# Patient Record
Sex: MALE | URBAN - METROPOLITAN AREA
[De-identification: names, ages, dates, MRNs, and addresses within clinical notes are randomized per-mention and may not be internally consistent; named-entity substitution may affect disease eponyms.]

---

## 2023-08-03 ENCOUNTER — EMERGENCY (EMERGENCY)
Facility: HOSPITAL | Age: 58
LOS: 1 days | Discharge: HOME | End: 2023-08-03
Attending: STUDENT IN AN ORGANIZED HEALTH CARE EDUCATION/TRAINING PROGRAM
Payer: COMMERCIAL

## 2023-08-03 VITALS
WEIGHT: 188.5 LBS | DIASTOLIC BLOOD PRESSURE: 80 MMHG | OXYGEN SATURATION: 98 % | RESPIRATION RATE: 18 BRPM | TEMPERATURE: 98 F | HEART RATE: 73 BPM | SYSTOLIC BLOOD PRESSURE: 150 MMHG

## 2023-08-03 VITALS
RESPIRATION RATE: 18 BRPM | HEART RATE: 68 BPM | OXYGEN SATURATION: 98 % | DIASTOLIC BLOOD PRESSURE: 68 MMHG | SYSTOLIC BLOOD PRESSURE: 142 MMHG | TEMPERATURE: 99 F

## 2023-08-03 LAB
ALBUMIN SERPL ELPH-MCNC: 3.7 G/DL — SIGNIFICANT CHANGE UP (ref 3.5–5)
ALP SERPL-CCNC: 81 U/L — SIGNIFICANT CHANGE UP (ref 40–120)
ALT FLD-CCNC: 34 U/L DA — SIGNIFICANT CHANGE UP (ref 10–60)
ANION GAP SERPL CALC-SCNC: 7 MMOL/L — SIGNIFICANT CHANGE UP (ref 5–17)
APPEARANCE UR: CLEAR — SIGNIFICANT CHANGE UP
AST SERPL-CCNC: 9 U/L — LOW (ref 10–40)
BACTERIA # UR AUTO: ABNORMAL /HPF
BASOPHILS # BLD AUTO: 0.04 K/UL — SIGNIFICANT CHANGE UP (ref 0–0.2)
BASOPHILS NFR BLD AUTO: 0.4 % — SIGNIFICANT CHANGE UP (ref 0–2)
BILIRUB SERPL-MCNC: 0.2 MG/DL — SIGNIFICANT CHANGE UP (ref 0.2–1.2)
BILIRUB UR-MCNC: NEGATIVE — SIGNIFICANT CHANGE UP
BUN SERPL-MCNC: 15 MG/DL — SIGNIFICANT CHANGE UP (ref 7–18)
CALCIUM SERPL-MCNC: 8.7 MG/DL — SIGNIFICANT CHANGE UP (ref 8.4–10.5)
CHLORIDE SERPL-SCNC: 105 MMOL/L — SIGNIFICANT CHANGE UP (ref 96–108)
CO2 SERPL-SCNC: 25 MMOL/L — SIGNIFICANT CHANGE UP (ref 22–31)
COLOR SPEC: YELLOW — SIGNIFICANT CHANGE UP
CREAT SERPL-MCNC: 0.9 MG/DL — SIGNIFICANT CHANGE UP (ref 0.5–1.3)
DIFF PNL FLD: NEGATIVE — SIGNIFICANT CHANGE UP
EGFR: 100 ML/MIN/1.73M2 — SIGNIFICANT CHANGE UP
EOSINOPHIL # BLD AUTO: 0.27 K/UL — SIGNIFICANT CHANGE UP (ref 0–0.5)
EOSINOPHIL NFR BLD AUTO: 2.7 % — SIGNIFICANT CHANGE UP (ref 0–6)
EPI CELLS # UR: NEGATIVE /HPF — SIGNIFICANT CHANGE UP
GLUCOSE SERPL-MCNC: 280 MG/DL — HIGH (ref 70–99)
GLUCOSE UR QL: NEGATIVE — SIGNIFICANT CHANGE UP
HCT VFR BLD CALC: 41 % — SIGNIFICANT CHANGE UP (ref 39–50)
HGB BLD-MCNC: 13.5 G/DL — SIGNIFICANT CHANGE UP (ref 13–17)
IMM GRANULOCYTES NFR BLD AUTO: 0.3 % — SIGNIFICANT CHANGE UP (ref 0–0.9)
KETONES UR-MCNC: NEGATIVE — SIGNIFICANT CHANGE UP
LEUKOCYTE ESTERASE UR-ACNC: NEGATIVE — SIGNIFICANT CHANGE UP
LIDOCAIN IGE QN: 808 U/L — HIGH (ref 73–393)
LYMPHOCYTES # BLD AUTO: 3.5 K/UL — HIGH (ref 1–3.3)
LYMPHOCYTES # BLD AUTO: 35.2 % — SIGNIFICANT CHANGE UP (ref 13–44)
MCHC RBC-ENTMCNC: 28.9 PG — SIGNIFICANT CHANGE UP (ref 27–34)
MCHC RBC-ENTMCNC: 32.9 GM/DL — SIGNIFICANT CHANGE UP (ref 32–36)
MCV RBC AUTO: 87.8 FL — SIGNIFICANT CHANGE UP (ref 80–100)
MONOCYTES # BLD AUTO: 0.73 K/UL — SIGNIFICANT CHANGE UP (ref 0–0.9)
MONOCYTES NFR BLD AUTO: 7.3 % — SIGNIFICANT CHANGE UP (ref 2–14)
NEUTROPHILS # BLD AUTO: 5.37 K/UL — SIGNIFICANT CHANGE UP (ref 1.8–7.4)
NEUTROPHILS NFR BLD AUTO: 54.1 % — SIGNIFICANT CHANGE UP (ref 43–77)
NITRITE UR-MCNC: NEGATIVE — SIGNIFICANT CHANGE UP
NRBC # BLD: 0 /100 WBCS — SIGNIFICANT CHANGE UP (ref 0–0)
PH UR: 6.5 — SIGNIFICANT CHANGE UP (ref 5–8)
PLATELET # BLD AUTO: 176 K/UL — SIGNIFICANT CHANGE UP (ref 150–400)
POTASSIUM SERPL-MCNC: 4.3 MMOL/L — SIGNIFICANT CHANGE UP (ref 3.5–5.3)
POTASSIUM SERPL-SCNC: 4.3 MMOL/L — SIGNIFICANT CHANGE UP (ref 3.5–5.3)
PROT SERPL-MCNC: 7.7 G/DL — SIGNIFICANT CHANGE UP (ref 6–8.3)
PROT UR-MCNC: 15 MG/DL
RBC # BLD: 4.67 M/UL — SIGNIFICANT CHANGE UP (ref 4.2–5.8)
RBC # FLD: 13.2 % — SIGNIFICANT CHANGE UP (ref 10.3–14.5)
RBC CASTS # UR COMP ASSIST: NEGATIVE /HPF — SIGNIFICANT CHANGE UP (ref 0–2)
SODIUM SERPL-SCNC: 137 MMOL/L — SIGNIFICANT CHANGE UP (ref 135–145)
SP GR SPEC: 1 — LOW (ref 1.01–1.02)
TROPONIN I, HIGH SENSITIVITY RESULT: 5.6 NG/L — SIGNIFICANT CHANGE UP
UROBILINOGEN FLD QL: NEGATIVE — SIGNIFICANT CHANGE UP
WBC # BLD: 9.94 K/UL — SIGNIFICANT CHANGE UP (ref 3.8–10.5)
WBC # FLD AUTO: 9.94 K/UL — SIGNIFICANT CHANGE UP (ref 3.8–10.5)
WBC UR QL: SIGNIFICANT CHANGE UP /HPF (ref 0–5)

## 2023-08-03 PROCEDURE — 80053 COMPREHEN METABOLIC PANEL: CPT

## 2023-08-03 PROCEDURE — 99285 EMERGENCY DEPT VISIT HI MDM: CPT | Mod: 25

## 2023-08-03 PROCEDURE — 83690 ASSAY OF LIPASE: CPT

## 2023-08-03 PROCEDURE — 96366 THER/PROPH/DIAG IV INF ADDON: CPT

## 2023-08-03 PROCEDURE — 93005 ELECTROCARDIOGRAM TRACING: CPT

## 2023-08-03 PROCEDURE — 74177 CT ABD & PELVIS W/CONTRAST: CPT | Mod: MA

## 2023-08-03 PROCEDURE — 87086 URINE CULTURE/COLONY COUNT: CPT

## 2023-08-03 PROCEDURE — 85025 COMPLETE CBC W/AUTO DIFF WBC: CPT

## 2023-08-03 PROCEDURE — 74177 CT ABD & PELVIS W/CONTRAST: CPT | Mod: 26,MA

## 2023-08-03 PROCEDURE — 96375 TX/PRO/DX INJ NEW DRUG ADDON: CPT

## 2023-08-03 PROCEDURE — 93010 ELECTROCARDIOGRAM REPORT: CPT

## 2023-08-03 PROCEDURE — 36415 COLL VENOUS BLD VENIPUNCTURE: CPT

## 2023-08-03 PROCEDURE — 96365 THER/PROPH/DIAG IV INF INIT: CPT | Mod: XU

## 2023-08-03 PROCEDURE — 84484 ASSAY OF TROPONIN QUANT: CPT

## 2023-08-03 PROCEDURE — 81001 URINALYSIS AUTO W/SCOPE: CPT

## 2023-08-03 RX ORDER — SODIUM CHLORIDE 9 MG/ML
1000 INJECTION, SOLUTION INTRAVENOUS ONCE
Refills: 0 | Status: DISCONTINUED | OUTPATIENT
Start: 2023-08-03 | End: 2023-08-03

## 2023-08-03 RX ORDER — SODIUM CHLORIDE 9 MG/ML
1000 INJECTION INTRAMUSCULAR; INTRAVENOUS; SUBCUTANEOUS ONCE
Refills: 0 | Status: DISCONTINUED | OUTPATIENT
Start: 2023-08-03 | End: 2023-08-03

## 2023-08-03 RX ORDER — ACETAMINOPHEN 500 MG
1000 TABLET ORAL ONCE
Refills: 0 | Status: COMPLETED | OUTPATIENT
Start: 2023-08-03 | End: 2023-08-03

## 2023-08-03 RX ORDER — SODIUM CHLORIDE 9 MG/ML
1000 INJECTION, SOLUTION INTRAVENOUS ONCE
Refills: 0 | Status: COMPLETED | OUTPATIENT
Start: 2023-08-03 | End: 2023-08-03

## 2023-08-03 RX ORDER — FAMOTIDINE 10 MG/ML
20 INJECTION INTRAVENOUS ONCE
Refills: 0 | Status: COMPLETED | OUTPATIENT
Start: 2023-08-03 | End: 2023-08-03

## 2023-08-03 RX ADMIN — SODIUM CHLORIDE 1000 MILLILITER(S): 9 INJECTION, SOLUTION INTRAVENOUS at 01:51

## 2023-08-03 RX ADMIN — Medication 1000 MILLIGRAM(S): at 03:10

## 2023-08-03 RX ADMIN — FAMOTIDINE 20 MILLIGRAM(S): 10 INJECTION INTRAVENOUS at 01:51

## 2023-08-03 RX ADMIN — SODIUM CHLORIDE 1000 MILLILITER(S): 9 INJECTION, SOLUTION INTRAVENOUS at 03:10

## 2023-08-03 RX ADMIN — Medication 400 MILLIGRAM(S): at 01:51

## 2023-08-03 NOTE — ED PROVIDER NOTE - PHYSICAL EXAMINATION
CONSTITUTIONAL: non-toxic, well appearing  SKIN: no rash, no petechiae.  EYES: pink conjunctiva, anicteric  NECK: Supple; no meningismus, no JVD  CARD: RRR, no murmurs, equal radial pulses bilaterally 2+  RESP: CTAB, no respiratory distress  ABD: Soft, tender over epigastrium and RUQ, neg Weller's sign, non-distended, no peritoneal signs, no CVA tenderness  EXT: Normal ROM x4. No edema.   NEURO: Alert, oriented. Neuro exam nonfocal  PSYCH: Cooperative, appropriate.

## 2023-08-03 NOTE — ED PROVIDER NOTE - NSFOLLOWUPINSTRUCTIONS_ED_ALL_ED_FT
Rest and stay well hydrated.     Take over the counter acetaminophen (Tylenol) 650-1000 mg every 4-6 hours as needed for pain. Do not take more than 3000 mg in a 24 hour period. Be aware many over the counter and prescription medications also contain acetaminophen (Tylenol).     Take over the counter ibuprofen 400-600 mg every 6 hours with food as needed for pain.  Do not take these medications if you do not have pain or if you have any history of bleeding disorder or, kidney disease. Do not use ibuprofen if you are on blood thinners (anti-coagulation).    Follow up with gastroenterologist as discussed (information provided).    SEEK IMMEDIATE MEDICAL CARE IF YOU HAVE ANY OF THE FOLLOWING SYMPTOMS: worsening abdominal pain, uncontrollable vomiting, profuse diarrhea, inability to have bowel movements or pass gas, black or bloody stools, fever accompanying chest pain or back pain, or fainting.

## 2023-08-03 NOTE — ED PROVIDER NOTE - PATIENT PORTAL LINK FT
You can access the FollowMyHealth Patient Portal offered by [facility] by registering at the following website: [email or web address]. By joining [employer]’s FollowMyHealth portal, you will also be able to view your health information using other applications (apps) compatible with our system.

## 2023-08-03 NOTE — ED PROVIDER NOTE - PROGRESS NOTE DETAILS
Jorge-: pt seen and re-evaluated at bedside.  Pt states their symptoms have improved.  Pt comfortable in NAD. CT showing "Minimal haziness suspected around the pancreatic head/uncinate process.   Recommend correlation with lipase for acute pancreatitis." Lipase mildly elevated. Pain well controlled, pt tolerating PO intake, pt appropriate for outpatient GI follow up. Discussed strict return precautions, pt understood and agreeable with plan.

## 2023-08-03 NOTE — ED ADULT NURSE NOTE - NSFALLUNIVINTERV_ED_ALL_ED
Bed/Stretcher in lowest position, wheels locked, appropriate side rails in place/Call bell, personal items and telephone in reach/Instruct patient to call for assistance before getting out of bed/chair/stretcher/Non-slip footwear applied when patient is off stretcher/[place] to call system/Physically safe environment - no spills, clutter or unnecessary equipment/Purposeful proactive rounding/Room/bathroom lighting operational, light cord in reach

## 2023-08-03 NOTE — ED PROVIDER NOTE - CLINICAL SUMMARY MEDICAL DECISION MAKING FREE TEXT BOX
Roel: 58-year-old male with past medical history hypertension, diabetes presents with abdominal pain x3 to 4 days.  Patient reports intermittent bilateral upper abdominal pain radiating to the back over the past 3 to 4 days.  Denies any fevers, chest pain, shortness of breath, vomiting, diarrhea, dysuria, numbness, weakness, or rash.  Denies any worsening with exertion, movement, position change, or oral intake.  Denies any history of abdominal surgeries in the past.  Patient reports occasional alcohol use, denies any recent alcohol use over the past week.  Patient tolerating oral intake.  Physical exam per above. Unclear etiology, ddx includes but not limited to gastritis, PUD, pancreatitis, cholelithiasis, cholecystitis, choledocholithiasis. Pain reproducible with palpation of abdomen, not consistent with ACS/anginal equivalent. Will obtain labs, imaging, provide supportive treatment with dispo pending workup.

## 2023-08-03 NOTE — ED PROVIDER NOTE - OBJECTIVE STATEMENT
58-year-old male with past medical history hypertension, diabetes presents with abdominal pain x3 to 4 days.  Patient reports intermittent bilateral upper abdominal pain radiating to the back over the past 3 to 4 days.  Denies any fevers, chest pain, shortness of breath, vomiting, diarrhea, dysuria, numbness, weakness, or rash.  Denies any worsening with exertion, movement, position change, or oral intake.  Denies any history of abdominal surgeries in the past.  Patient reports occasional alcohol use, denies any recent alcohol use over the past week.  Patient tolerating oral intake.  Denies any additional complaints.

## 2023-08-03 NOTE — ED ADULT NURSE NOTE - OBJECTIVE STATEMENT
generalized abdominal pain x couple of days with epigastric pain. denies nausea, vomiting, diarrhea and constipation. admits to ETOH use a few days ago.

## 2023-08-04 LAB
CULTURE RESULTS: NO GROWTH — SIGNIFICANT CHANGE UP
SPECIMEN SOURCE: SIGNIFICANT CHANGE UP

## 2024-01-12 VITALS
HEIGHT: 74 IN | TEMPERATURE: 98 F | HEART RATE: 86 BPM | SYSTOLIC BLOOD PRESSURE: 113 MMHG | OXYGEN SATURATION: 97 % | WEIGHT: 186.95 LBS | DIASTOLIC BLOOD PRESSURE: 76 MMHG | RESPIRATION RATE: 16 BRPM

## 2024-01-12 RX ORDER — CHLORHEXIDINE GLUCONATE 213 G/1000ML
1 SOLUTION TOPICAL ONCE
Refills: 0 | Status: DISCONTINUED | OUTPATIENT
Start: 2024-01-16 | End: 2024-01-17

## 2024-01-12 RX ORDER — SODIUM CHLORIDE 9 MG/ML
500 INJECTION INTRAMUSCULAR; INTRAVENOUS; SUBCUTANEOUS
Refills: 0 | Status: DISCONTINUED | OUTPATIENT
Start: 2024-01-16 | End: 2024-01-17

## 2024-01-12 NOTE — H&P ADULT - RADIAL PULSE
2+ b/l unable to palpate R radial pulse (+dopplerable, good waveform, collateral flow to hand confirmed; capillary refill <2 seconds; well-healing incision to right radial artery area); left 2+

## 2024-01-12 NOTE — H&P ADULT - ASSESSMENT
58-year-old male, current smoker (15 pack years), PMHx HTN, HLD, T2DM who in light of patient's risk factors and known CAD on recent cath, pt now presents to [facility] for staged cardiac catheterization of LCx.     ASA IV				Mallampati class: II	                - VSS  - H/H stable, patient denies BRBPR, melena, hematuria, or further bleeding.   -  Sedation Plan: Moderate  - Patient Is Suitable Candidate For Sedation?  Yes  - Cath Order Entered: Yes  - DAPT LOAD Ordered: Patient has currently taken ASA 81 mg QD and Plavix 75 mg QD since 1/10/24 and reports good adherence, denies missed doses; last dose of both 1/15/24 AM. Ordered ASA 81 mg POx1 and Plavix 300 mg POx 1 (confirmed with interventional fellow Dr. Chino).   - PRE-CATH FLUIDS: Yes; patient is euvolemic on exam; Cr 0.85; EF 62% (per TTE 1/9/24). Ordered IV fluid hydration per protocol: IV  cc bolusx 1 over 30 mins followed by IV NS 75 cc/hr x 2 hours.   - ALLERGY: No indication for pre-cath steroid prophylaxis   - Informed consent is in the patient's chart.    Risks Benefits Annotation:     CARDIAC CATH: Risks & benefits of procedure and sedation and risks and benefits for the alternative therapy have been explained to the patient and/or HCP in layman’s terms including but not limited to: allergic reaction, bleeding, infection, arrhythmia, respiratory compromise, renal and vascular compromise, limb damage, MI, CVA, emergent CABG/Vascular Surgery and death. Informed consent obtained and in chart.   58-year-old male, current smoker (15 pack years), PMHx HTN, HLD, T2DM who in light of patient's risk factors and known CAD on recent cath, pt now presents to [facility] for staged cardiac catheterization of LCx.     ASA IV				Mallampati class: II	                - VSS  - H/H stable, patient denies BRBPR, melena, hematuria, or further bleeding.   -  Sedation Plan: Moderate  - Patient Is Suitable Candidate For Sedation?  Yes  - Cath Order Entered: Yes  - DAPT LOAD Ordered: Patient has currently taken ASA 81 mg QD and Plavix 75 mg QD since 1/10/24 and reports good adherence, denies missed doses; last dose of both 1/15/24 AM. Ordered ASA 81 mg POx1 and Plavix 300 mg POx 1 (confirmed with interventional fellow Dr. Chino).   - PRE-CATH FLUIDS: Yes; patient is euvolemic on exam; Cr 0.85; EF 62% (per TTE 1/9/24). Ordered IV fluid hydration per protocol: IV  cc bolusx 1 over 30 mins followed by IV NS 75 cc/hr x 2 hours.   - ALLERGY: No indication for pre-cath steroid prophylaxis   - Informed consent is in the patient's chart.    Risks Benefits Annotation:     CARDIAC CATH: Risks & benefits of procedure and sedation and risks and benefits for the alternative therapy have been explained to the patient and/or HCP in layman’s terms including but not limited to: allergic reaction, bleeding, infection, arrhythmia, respiratory compromise, renal and vascular compromise, limb damage, MI, CVA, emergent CABG/Vascular Surgery and death. Informed consent obtained and in chart.   58-year-old male, current smoker (15 pack years), PMHx HTN, HLD, T2DM who in light of patient's risk factors and known CAD on recent cath, pt now presents to [facility] for staged cardiac catheterization of LCx.     ASA IV				Mallampati class: II	                - VSS  - H/H stable, patient denies BRBPR, melena, hematuria, or further bleeding.   -  Sedation Plan: Moderate  - Patient Is Suitable Candidate For Sedation?  Yes  - Cath Order Entered: Yes  - DAPT LOAD Ordered: Patient has currently taken ASA 81 mg QD and Plavix 75 mg QD since 1/10/24 and reports good adherence, denies missed doses; last dose of both 1/15/24 AM. Ordered ASA 81 mg POx1 and Plavix 300 mg POx 1 (confirmed with interventional fellow Dr. Chino).   - PRE-CATH FLUIDS: Yes; patient is euvolemic on exam; Cr 0.85; EF 62% (per TTE 1/9/24). Ordered IV fluid hydration per protocol: IV  cc bolusx 1 over 30 mins followed by IV NS 75 cc/hr x 2 hours.   - ALLERGY: No indication for pre-cath steroid prophylaxis   - Informed consent is in the patient's chart.    Risks Benefits Annotation:     CARDIAC CATH: Risks & benefits of procedure and sedation and risks and benefits for the alternative therapy have been explained to the patient and/or HCP in layman’s terms including but not limited to: allergic reaction, bleeding, infection, arrhythmia, respiratory compromise, renal and vascular compromise, limb damage, MI, CVA, emergent CABG/Vascular Surgery and death. Informed consent obtained and in chart.   58-year-old male, current smoker (15 pack years), PMHx HTN, HLD, T2DM who in light of patient's risk factors and known CAD on recent cath, pt now presents to [facility] for staged cardiac catheterization of LCx.     ASA IV				Mallampati class: II	                - VSS  - H/H stable, patient denies BRBPR, melena, hematuria, or further bleeding.   -  Sedation Plan: Moderate  - Patient Is Suitable Candidate For Sedation?  Yes  - Cath Order Entered: Yes  - DAPT LOAD Ordered: Patient has currently taken ASA 81 mg QD and Plavix 75 mg QD since 1/10/24 and reports good adherence, denies missed doses; last dose of both 1/15/24 AM. Ordered ASA 81 mg POx1 and Plavix 300 mg POx 1 (confirmed with interventional fellow Dr. Chino).   - PRE-CATH FLUIDS: Yes; patient is euvolemic on exam; Cr 0.85; EF 62% (per TTE 1/9/24). Ordered IV fluid hydration per protocol: IV  cc bolusx 1 over 30 mins followed by IV NS 75 cc/hr x 2 hours.   - ALLERGY: No indication for pre-cath steroid prophylaxis   - Informed consent is in the patient's chart.    Risks Benefits Annotation:     CARDIAC CATH: Risks & benefits of procedure and sedation and risks and benefits for the alternative therapy have been explained to the patient and/or HCP in layman’s terms including but not limited to: allergic reaction, bleeding, infection, arrhythmia, respiratory compromise, renal and vascular compromise, limb damage, MI, CVA, emergent CABG/Vascular Surgery and death. Informed consent obtained and in chart.   58-year-old male, current smoker (15 pack years), PMHx HTN, HLD, T2DM who in light of patient's risk factors and known CAD on recent cath, pt now presents to [facility] for staged cardiac catheterization of LCx.     ASA IV				Mallampati class: II	                - VSS  - H/H stable, patient denies BRBPR, melena, hematuria, or further bleeding.   -  Sedation Plan: Moderate  - Patient Is Suitable Candidate For Sedation?  Yes  - Cath Order Entered: Yes  - DAPT LOAD Ordered: Patient has currently taken ASA 81 mg QD and Plavix 75 mg QD since 1/10/24 and reports good adherence, denies missed doses; last dose of both 1/15/24 AM. Ordered ASA 81 mg POx1 and Plavix 300 mg POx 1 (confirmed with interventional (IC) fellow Dr. Chino).   - PRE-CATH FLUIDS: Yes; patient is euvolemic on exam; Cr 0.85; EF 62% (per TTE 1/9/24). Ordered IV fluid hydration per protocol: IV  cc bolusx 1 over 30 mins followed by IV NS 75 cc/hr x 2 hours.   - ALLERGY: No indication for pre-cath steroid prophylaxis   - Discussed access with IC fellow; will access left radial artery.  - Informed consent is in the patient's chart.    Risks Benefits Annotation:     CARDIAC CATH: Risks & benefits of procedure and sedation and risks and benefits for the alternative therapy have been explained to the patient and/or HCP in layman’s terms including but not limited to: allergic reaction, bleeding, infection, arrhythmia, respiratory compromise, renal and vascular compromise, limb damage, MI, CVA, emergent CABG/Vascular Surgery and death. Informed consent obtained and in chart.   58-year-old male, current smoker (15 pack years), PMHx HTN, HLD, T2DM who in light of patient's risk factors and known CAD on recent cath, pt now presents to [facility] for staged cardiac catheterization of LCx.     ASA IV				Mallampati class: II	                - VSS  - H/H stable, patient denies BRBPR, melena, hematuria, or further bleeding.   -  Sedation Plan: Moderate  - Patient Is Suitable Candidate For Sedation?  Yes  - Cath Order Entered: Yes  - DAPT LOAD Ordered: Patient has currently taken ASA 81 mg QD and Plavix 75 mg QD since 1/10/24 and reports good adherence, denies missed doses; last dose of both 1/15/24 AM. Ordered ASA 81 mg POx1 and Plavix 300 mg POx 1 (confirmed with interventional (IC) fellow Dr. Chino).   - PRE-CATH FLUIDS: Yes; patient is euvolemic on exam; Cr 0.85; EF 62% (per TTE 1/9/24). Ordered IV fluid hydration per protocol: IV  cc bolusx 1 over 30 mins followed by IV NS 75 cc/hr x 2 hours.   - ALLERGY: No indication for pre-cath steroid prophylaxis   - Discussed access with IC fellow; will access left radial artery.  - Informed consent is in the patient's chart.    Risks Benefits Annotation:     CARDIAC CATH: Risks & benefits of procedure and sedation and risks and benefits for the alternative therapy have been explained to the patient and/or HCP in layman’s terms including but not limited to: allergic reaction, bleeding, infection, arrhythmia, respiratory compromise, renal and vascular compromise, limb damage, MI, CVA, emergent CABG/Vascular Surgery and death. Informed consent obtained and in chart.   58-year-old male, current smoker (15 pack years), PMHx HTN, HLD, T2DM who in light of patient's risk factors and known CAD on recent cath, pt now presents to [facility] for staged cardiac catheterization of LCx.     ASA IV				Mallampati class: II	                - VSS  - H/H stable, patient denies BRBPR, melena, hematuria, or further bleeding.   -  Sedation Plan: Moderate  - Patient Is Suitable Candidate For Sedation?  Yes  - Cath Order Entered: Yes  - DAPT LOAD Ordered: Patient has currently taken ASA 81 mg QD and Plavix 75 mg QD since 1/10/24 and reports good adherence, denies missed doses; last dose of both 1/15/24 AM. Ordered ASA 81 mg POx1 and Plavix 300 mg POx 1 (confirmed with interventional (IC) fellow Dr. Chino).   - PRE-CATH FLUIDS: Yes; patient is euvolemic on exam; Cr 0.85; EF 62% (per TTE 1/9/24). Ordered IV fluid hydration per protocol: IV  cc bolusx 1 over 30 mins followed by IV NS 75 cc/hr x 2 hours.   - ALLERGY: No indication for pre-cath steroid prophylaxis   - Discussed access with IC fellow; will access left radial artery.  - Informed consent is in the patient's chart.    Risks Benefits Annotation:     CARDIAC CATH: Risks & benefits of procedure and sedation and risks and benefits for the alternative therapy have been explained to the patient and/or HCP in layman’s terms including but not limited to: allergic reaction, bleeding, infection, arrhythmia, respiratory compromise, renal and vascular compromise, limb damage, MI, CVA, emergent CABG/Vascular Surgery and death. Informed consent obtained and in chart.   58-year-old male, current smoker (15 pack years), PMHx HTN, HLD, T2DM who in light of patient's risk factors and known CAD on recent cath, pt now presents to [facility] for staged cardiac catheterization of LCx.     ASA IV				Mallampati class: II	                - VSS  - H/H stable, patient denies BRBPR, melena, hematuria, or further bleeding.   - WBC 12.54; pt afebrile and without infectious symptoms.   -  Sedation Plan: Moderate  - Patient Is Suitable Candidate For Sedation?  Yes  - Cath Order Entered: Yes  - DAPT LOAD Ordered: Patient has currently taken ASA 81 mg QD and Plavix 75 mg QD since 1/10/24 and reports good adherence, denies missed doses; last dose of both 1/15/24 AM. Ordered ASA 81 mg POx1 and Plavix 300 mg POx 1 (confirmed with interventional (IC) fellow Dr. Chino).   - PRE-CATH FLUIDS: Yes; patient is euvolemic on exam; Cr 0.85; EF 62% (per TTE 1/9/24). Ordered IV fluid hydration per protocol: IV  cc bolusx 1 over 30 mins followed by IV NS 75 cc/hr x 2 hours.   - ALLERGY: No indication for pre-cath steroid prophylaxis   - Discussed access with IC fellow; will access left radial artery.  - Informed consent is in the patient's chart.    Risks Benefits Annotation:     CARDIAC CATH: Risks & benefits of procedure and sedation and risks and benefits for the alternative therapy have been explained to the patient and/or HCP in layman’s terms including but not limited to: allergic reaction, bleeding, infection, arrhythmia, respiratory compromise, renal and vascular compromise, limb damage, MI, CVA, emergent CABG/Vascular Surgery and death. Informed consent obtained and in chart.   58-year-old male, current smoker (15 pack years), PMHx HTN, HLD, T2DM who in light of patient's risk factors and known CAD on recent cath, pt now presents to [facility] for staged cardiac catheterization of LCx.     ASA IV				Mallampati class: II	                - VSS  - H/H stable, patient denies BRBPR, melena, hematuria, or further bleeding.   - WBC 12.54; pt afebrile and without infectious symptoms.   -  Sedation Plan: Moderate  - Patient Is Suitable Candidate For Sedation?  Yes  - Cath Order Entered: Yes  - DAPT LOAD Ordered: Patient has currently taken ASA 81 mg QD and Plavix 75 mg QD since 1/10/24 and reports good adherence, denies missed doses; last dose of both 1/15/24 AM. Ordered ASA 81 mg POx1 and Plavix 300 mg POx 1 (confirmed with interventional (IC) fellow Dr. Chino).   - PRE-CATH FLUIDS: Yes; patient is euvolemic on exam; Cr 0.85; EF 62% (per TTE 1/9/24). Ordered IV fluid hydration per protocol: IV  cc bolusx 1 over 30 mins followed by IV NS 75 cc/hr x 2 hours.   - ALLERGY: No indication for pre-cath steroid prophylaxis   - Discussed access with IC fellow; will access left radial artery.  - Informed consent is in the patient's chart.    Risks Benefits Annotation:     CARDIAC CATH: Risks & benefits of procedure and sedation and risks and benefits for the alternative therapy have been explained to the patient and/or HCP in layman’s terms including but not limited to: allergic reaction, bleeding, infection, arrhythmia, respiratory compromise, renal and vascular compromise, limb damage, MI, CVA, emergent CABG/Vascular Surgery and death. Informed consent obtained and in chart.   58-year-old male, current smoker (15 pack years), PMHx HTN, HLD, T2DM who in light of patient's risk factors and known CAD on recent cath, pt now presents to [facility] for staged cardiac catheterization of LCx.     ASA IV				Mallampati class: II	                - VSS  - H/H stable, patient denies BRBPR, melena, hematuria, or further bleeding.   - WBC 12.54; pt afebrile and without infectious symptoms.   -  Sedation Plan: Moderate  - Patient Is Suitable Candidate For Sedation?  Yes  - Cath Order Entered: Yes  - DAPT LOAD Ordered: Patient has currently taken ASA 81 mg QD and Plavix 75 mg QD since 1/10/24 and reports good adherence, denies missed doses; last dose of both 1/15/24 AM. Ordered ASA 81 mg POx1 and Plavix 300 mg POx 1 (confirmed with interventional (IC) fellow Dr. Chino).   - PRE-CATH FLUIDS: Yes; patient is euvolemic on exam; Cr 0.85; EF 62% (per TTE 1/9/24). Ordered IV fluid hydration per protocol: IV  cc bolusx 1 over 30 mins followed by IV NS 75 cc/hr x 2 hours.   - ALLERGY: No indication for pre-cath steroid prophylaxis   - Discussed access with IC fellow; will access left radial artery.  - Informed consent is in the patient's chart.    Risks Benefits Annotation:     CARDIAC CATH: Risks & benefits of procedure and sedation and risks and benefits for the alternative therapy have been explained to the patient and/or HCP in layman’s terms including but not limited to: allergic reaction, bleeding, infection, arrhythmia, respiratory compromise, renal and vascular compromise, limb damage, MI, CVA, emergent CABG/Vascular Surgery and death. Informed consent obtained and in chart.   58-year-old male, current smoker (15 pack years), PMHx HTN, HLD, T2DM who in light of patient's risk factors and known CAD on recent cath, pt now presents to [facility] for staged cardiac catheterization of LCx.     ASA IV				Mallampati class: II	                - VSS  - H/H stable, patient denies BRBPR, melena, hematuria, or further bleeding.   - WBC 12.54; pt afebrile and without infectious symptoms.   -  Sedation Plan: Moderate  - Patient Is Suitable Candidate For Sedation?  Yes  - Cath Order Entered: Yes  - DAPT LOAD Ordered: Patient has currently taken ASA 81 mg QD and Plavix 75 mg QD since 1/10/24 and reports good adherence, denies missed doses; last dose of both 1/15/24 AM. Ordered ASA 81 mg POx1 and Plavix 300 mg POx 1 (confirmed with interventional (IC) fellow Dr. Chino).   - PRE-CATH FLUIDS: Yes; patient is euvolemic on exam; Cr 0.85; EF 62% (per TTE 1/9/24). Ordered IV fluid hydration per protocol: IV  cc bolusx 1 over 30 mins followed by IV NS 75 cc/hr x 2 hours.   - ALLERGY: No indication for pre-cath steroid prophylaxis   - Discussed access with IC fellow; will access left radial artery.  - Informed consent is in the patient's chart.    Risks Benefits Annotation:     CARDIAC CATH: Risks & benefits of procedure and sedation and risks and benefits for the alternative therapy have been explained to the patient and/or HCP in layman’s terms including but not limited to: allergic reaction, bleeding, infection, arrhythmia, respiratory compromise, renal and vascular compromise, limb damage, MI, CVA, emergent CABG/Vascular Surgery and death. Informed consent obtained and in chart.

## 2024-01-12 NOTE — H&P ADULT - NSICDXPASTMEDICALHX_GEN_ALL_CORE_FT
PAST MEDICAL HISTORY:  HLD (hyperlipidemia)     HTN (hypertension)     NSTEMI (non-ST elevation myocardial infarction)

## 2024-01-12 NOTE — H&P ADULT - RESPIRATORY
normal/clear to auscultation bilaterally/no wheezes/no rales/no rhonchi/no respiratory distress/no use of accessory muscles/no subcutaneous emphysema/airway patent/good air movement

## 2024-01-12 NOTE — H&P ADULT - HISTORY OF PRESENT ILLNESS
Cardiologist:  Pharmacy:  Escort:    58 yoM PMHx HTN, HLD, T2DM who presented to [facility] 1/10/24 with chest pain + CCTA r/i NSTEMI (Trop I peak 0.330) . Pt had diagnostic LHC 1/10/24 (see below) with planned PCI of culprit lesion LLCx at [facility] as an outpatient. Pt currently denies chest pain, shortness of breath, abdominal pain, N/V/D, fatigue, syncope, presyncope, leg pain/swelling, fever and recent sick contacts. In light of pts risk factors, CCS class __ anginal symptoms and abnormal CCTA and LHC, pt now presents to [facility] for staged cardiac catheterization of LLCx.     LHC 1/10/24: m/d LCx 95% stenosed, LPAV 80% stenosed. RCA small mod diffuse, p/mRCA 40% stenosed. LM large minimal LI. LAD large, moderate diffuse, D1 50% stenosed. LVEF 65%, trivial MR. Right radial access.  CCTA 1/10/24: 3vCAD significant obstructive dz diagonal branch of LAD and OM1 of LCX  Echo 1/9/24: EF 62, normal wall motion, trace TR. no AS.     Cardiologist:  Pharmacy:  Escort:    58 yoM PMHx HTN, HLD, T2DM who presented to [facility] 1/10/24 with chest pain + CCTA r/i NSTEMI (Trop I peak 0.330) . Pt had diagnostic LHC 1/10/24 (see below) with planned PCI of culprit lesion LLCx at [facility] as an outpatient. Pt currently denies chest pain, shortness of breath, abdominal pain, N/V/D, fatigue, syncope, presyncope, leg pain/swelling, fever and recent sick contacts. In light of pts risk factors, CCS class __ anginal symptoms and abnormal CCTA and LHC, pt now presents to [facility] for staged cardiac catheterization of LLCx.     LHC 1/10/24: m/d LCx 95% stenosed, LPAV 80% stenosed. RCA small mod diffuse, p/mRCA 40% stenosed. LM large minimal LI. LAD large, moderate diffuse, D1 50% stenosed. LVEF 65%, trivial MR. Right radial access.  CCTA 1/10/24: 3vCAD significant obstructive dz diagonal branch of LAD and OM1 of LCX  Echo 1/9/24: EF 62, normal wall motion, trace TR. no AS.     Cardiologist:  Pharmacy:  Escort:    58 yoM PMHx HTN, HLD, T2DM who presented to [facility] 1/10/24 with chest pain + CCTA r/i NSTEMI (Trop I peak 0.330) . Pt had diagnostic LHC 1/10/24 (see below) with planned PCI of culprit lesion LLCx at [facility] as an outpatient. Pt currently denies chest pain, shortness of breath, abdominal pain, N/V/D, fatigue, syncope, presyncope, leg pain/swelling, fever and recent sick contacts. In light of pts risk factors, CCS class __ anginal symptoms and abnormal CCTA and LHC, pt now presents to [facility] for staged cardiac catheterization of LLCx.     LHC 1/10/24: m/d LCx 95% stenosed, LPAV 80% stenosed. RCA small mod diffuse, p/mRCA 40% stenosed. LM large minimal LI. LAD large, moderate diffuse, D1 50% stenosed. LVEF 65%, trivial MR. Right radial access.  CCTA 1/10/24: 3vCAD significant obstructive dz diagonal branch of LAD and OM1 of LCX  Echo 1/9/24: EF 62, normal wall motion, trace TR. no AS.     Cardiologist:  Pharmacy:  Escort:    58 yoM PMHx HTN, HLD, T2DM who presented to [facility] 1/10/24 with chest pain + CCTA r/i NSTEMI (Trop I peak 0.330) . Pt had diagnostic LHC 1/10/24 (see below) with planned PCI of culprit lesion LLCx at [facility] as an outpatient. Pt currently denies chest pain, shortness of breath, abdominal pain, N/V/D, fatigue, syncope, presyncope, leg pain/swelling, fever and recent sick contacts. In light of pts risk factors, CCS class __ anginal symptoms and abnormal CCTA and LHC, pt now presents to [facility] for staged cardiac catheterization of LLCx.     LHC 1/10/24: m/d LCx 95% stenosed, LPAV 80% stenosed. RCA small mod diffuse, p/mRCA 40% stenosed. LM large minimal LI. LAD large, moderate diffuse, D1 50% stenosed. LVEF 65%, trivial MR. Right radial access.  CCTA 1/10/24: 3vCAD significant obstructive dz diagonal branch of LAD and OM1 of LCX  Echo 1/9/24: EF 62, normal wall motion, trace TR. no AS.     Cardiologist: Dr. Smith   Pharmacy: [facility], [address] [place] [address]  Escort: friend     58-year-old male, current smoker (15 pack years), PMHx HTN, HLD, T2DM who presented to [facility] 1/10/24 with chest pain + CCTA r/i NSTEMI (Trop I peak 0.330) . Pt had diagnostic C 1/10/24 (see below) with planned PCI of culprit lesion LLCx at [facility] as an outpatient. Pt currently denies chest pain, shortness of breath, abdominal pain, N/V/D, fatigue, syncope, presyncope, leg pain/swelling, fever and recent sick contacts.    LHC (1/10/24): m/d LCx 95% stenosed, LPAV 80% stenosed. RCA small mod diffuse, p/mRCA 40% stenosed. LM large minimal LI. LAD large, moderate diffuse, D1 50% stenosed. LVEF 65%, trivial MR. Right radial access.  CCTA (1/10/24): 3vCAD significant obstructive dz diagonal branch of LAD and OM1 of LCX  Echo (1/9/24): EF 62, normal wall motion, trace TR. no AS.    In light of patient's risk factors and known CAD on recent cath, pt now presents to [facility] for staged cardiac catheterization of LCx.  Cardiologist: Dr. Smith   Pharmacy: [facility], [address] [place] [address]  Escort: friend     58-year-old male, current smoker (15 pack years), PMHx HTN, HLD, T2DM who presented to [facility] 1/10/24 with chest pain + CCTA r/i NSTEMI (Trop I peak 0.330) . Pt had diagnostic C 1/10/24 (see below) with planned PCI of culprit lesion LLCx at [facility] as an outpatient. Pt currently denies chest pain, shortness of breath, abdominal pain, N/V/D, fatigue, syncope, presyncope, leg pain/swelling, fever and recent sick contacts.    LHC (1/10/24): m/d LCx 95% stenosed, LPAV 80% stenosed. RCA small mod diffuse, p/mRCA 40% stenosed. LM large minimal LI. LAD large, moderate diffuse, D1 50% stenosed. LVEF 65%, trivial MR. Right radial access.  CCTA (1/10/24): 3vCAD significant obstructive dz diagonal branch of LAD and OM1 of LCX  Echo (1/9/24): EF 62, normal wall motion, trace TR. no AS.    In light of patient's risk factors and known CAD on recent cath, pt now presents to [facility] for staged cardiac catheterization of LCx.  Cardiologist: Dr. Smith   Pharmacy: [facility], [address] [place] [address]  Escort: friend     58-year-old male, current smoker (15 pack years), PMHx HTN, HLD, T2DM who presented to [facility] 1/10/24 with chest pain + CCTA r/i NSTEMI (Trop I peak 0.330) . Pt had diagnostic C 1/10/24 (see below) with planned PCI of culprit lesion LLCx at [facility] as an outpatient. Pt currently denies chest pain, shortness of breath, abdominal pain, N/V/D, fatigue, syncope, presyncope, leg pain/swelling, fever and recent sick contacts.    LHC (1/10/24): m/d LCx 95% stenosed, LPAV 80% stenosed. RCA small mod diffuse, p/mRCA 40% stenosed. LM large minimal LI. LAD large, moderate diffuse, D1 50% stenosed. LVEF 65%, trivial MR. Right radial access.  CCTA (1/10/24): 3vCAD significant obstructive dz diagonal branch of LAD and OM1 of LCX  Echo (1/9/24): EF 62, normal wall motion, trace TR. no AS.    In light of patient's risk factors and known CAD on recent cath, pt now presents to [facility] for staged cardiac catheterization of LCx.  Cardiologist: Dr. Smith   Pharmacy: [facility], [address] [place] [address]  Escort: friend     58-year-old male, current smoker (15 pack years), PMHx HTN, HLD, T2DM who presented to [facility] 1/10/24 with chest pain + CCTA r/i NSTEMI (Trop I peak 0.330) . Pt had diagnostic C 1/10/24 (see below) with planned PCI of culprit lesion LLCx at [facility] as an outpatient. Pt currently denies chest pain, shortness of breath, abdominal pain, N/V/D, fatigue, syncope, presyncope, leg pain/swelling, fever and recent sick contacts.    LHC (1/10/24): m/d LCx 95% stenosed, LPAV 80% stenosed. RCA small mod diffuse, p/mRCA 40% stenosed. LM large minimal LI. LAD large, moderate diffuse, D1 50% stenosed. LVEF 65%, trivial MR. Right radial access.  CCTA (1/10/24): 3vCAD significant obstructive dz diagonal branch of LAD and OM1 of LCX  Echo (1/9/24): EF 62, normal wall motion, trace TR. no AS.    In light of patient's risk factors and known CAD on recent cath, pt now presents to [facility] for staged cardiac catheterization of LCx.

## 2024-01-12 NOTE — H&P ADULT - NSHPLABSRESULTS_GEN_ALL_CORE
LABS:                          14.6   12.54 )-----------( 236      ( 16 Jan 2024 06:55 )             43.9     01-16    137  |  103  |  18  ----------------------------<  176<H>  4.7   |  23  |  0.85    Ca    9.9      16 Jan 2024 06:55  Mg     2.0     01-16    TPro  8.3  /  Alb  5.0  /  TBili  0.3  /  DBili  x   /  AST  21  /  ALT  42  /  AlkPhos  89  01-16    LIVER FUNCTIONS - ( 16 Jan 2024 06:55 )  Alb: 5.0 g/dL / Pro: 8.3 g/dL / ALK PHOS: 89 U/L / ALT: 42 U/L / AST: 21 U/L / GGT: x           PT/INR - ( 16 Jan 2024 06:55 )   PT: 10.5 sec;   INR: 0.92          PTT - ( 16 Jan 2024 06:55 )  PTT:34.3 sec  Urinalysis Basic - ( 16 Jan 2024 06:55 )    Color: x / Appearance: x / SG: x / pH: x  Gluc: 176 mg/dL / Ketone: x  / Bili: x / Urobili: x   Blood: x / Protein: x / Nitrite: x   Leuk Esterase: x / RBC: x / WBC x   Sq Epi: x / Non Sq Epi: x / Bacteria: x    EKG (1/16/24): NSR at 73 bpm

## 2024-01-16 ENCOUNTER — INPATIENT (INPATIENT)
Facility: HOSPITAL | Age: 59
LOS: 0 days | Discharge: HOME | DRG: 322 | End: 2024-01-17
Attending: INTERNAL MEDICINE | Admitting: INTERNAL MEDICINE
Payer: COMMERCIAL

## 2024-01-16 LAB
A1C WITH ESTIMATED AVERAGE GLUCOSE RESULT: 9.4 % — HIGH (ref 4–5.6)
ALBUMIN SERPL ELPH-MCNC: 5 G/DL — SIGNIFICANT CHANGE UP (ref 3.3–5)
ALP SERPL-CCNC: 89 U/L — SIGNIFICANT CHANGE UP (ref 40–120)
ALT FLD-CCNC: 42 U/L — SIGNIFICANT CHANGE UP (ref 10–45)
ANION GAP SERPL CALC-SCNC: 11 MMOL/L — SIGNIFICANT CHANGE UP (ref 5–17)
APTT BLD: 34.3 SEC — SIGNIFICANT CHANGE UP (ref 24.5–35.6)
AST SERPL-CCNC: 21 U/L — SIGNIFICANT CHANGE UP (ref 10–40)
BASE EXCESS BLDV CALC-SCNC: 1.5 MMOL/L — SIGNIFICANT CHANGE UP (ref -2–3)
BASOPHILS # BLD AUTO: 0.06 K/UL — SIGNIFICANT CHANGE UP (ref 0–0.2)
BASOPHILS NFR BLD AUTO: 0.5 % — SIGNIFICANT CHANGE UP (ref 0–2)
BILIRUB SERPL-MCNC: 0.3 MG/DL — SIGNIFICANT CHANGE UP (ref 0.2–1.2)
BUN SERPL-MCNC: 18 MG/DL — SIGNIFICANT CHANGE UP (ref 7–23)
CA-I SERPL-SCNC: 1.21 MMOL/L — SIGNIFICANT CHANGE UP (ref 1.15–1.33)
CALCIUM SERPL-MCNC: 9.9 MG/DL — SIGNIFICANT CHANGE UP (ref 8.4–10.5)
CHLORIDE SERPL-SCNC: 103 MMOL/L — SIGNIFICANT CHANGE UP (ref 96–108)
CHOLEST SERPL-MCNC: 155 MG/DL — SIGNIFICANT CHANGE UP
CO2 BLDV-SCNC: 29.8 MMOL/L — HIGH (ref 22–26)
CO2 SERPL-SCNC: 23 MMOL/L — SIGNIFICANT CHANGE UP (ref 22–31)
COHGB MFR BLDV: 2.1 % — SIGNIFICANT CHANGE UP
CREAT SERPL-MCNC: 0.85 MG/DL — SIGNIFICANT CHANGE UP (ref 0.5–1.3)
EGFR: 102 ML/MIN/1.73M2 — SIGNIFICANT CHANGE UP
EOSINOPHIL # BLD AUTO: 0.27 K/UL — SIGNIFICANT CHANGE UP (ref 0–0.5)
EOSINOPHIL NFR BLD AUTO: 2.2 % — SIGNIFICANT CHANGE UP (ref 0–6)
ESTIMATED AVERAGE GLUCOSE: 223 MG/DL — HIGH (ref 68–114)
GAS PNL BLDV: 134 MMOL/L — LOW (ref 136–145)
GLUCOSE BLDC GLUCOMTR-MCNC: 123 MG/DL — HIGH (ref 70–99)
GLUCOSE BLDC GLUCOMTR-MCNC: 123 MG/DL — HIGH (ref 70–99)
GLUCOSE BLDC GLUCOMTR-MCNC: 153 MG/DL — HIGH (ref 70–99)
GLUCOSE BLDC GLUCOMTR-MCNC: 164 MG/DL — HIGH (ref 70–99)
GLUCOSE BLDC GLUCOMTR-MCNC: 217 MG/DL — HIGH (ref 70–99)
GLUCOSE BLDV-MCNC: 171 MG/DL — HIGH (ref 70–99)
GLUCOSE SERPL-MCNC: 176 MG/DL — HIGH (ref 70–99)
HCO3 BLDV-SCNC: 28 MMOL/L — SIGNIFICANT CHANGE UP (ref 22–29)
HCT VFR BLD CALC: 43.9 % — SIGNIFICANT CHANGE UP (ref 39–50)
HCT VFR BLDA CALC: 46 % — SIGNIFICANT CHANGE UP
HDLC SERPL-MCNC: 48 MG/DL — SIGNIFICANT CHANGE UP
HGB BLD CALC-MCNC: 15.3 G/DL — SIGNIFICANT CHANGE UP (ref 12.6–17.4)
HGB BLD-MCNC: 14.6 G/DL — SIGNIFICANT CHANGE UP (ref 13–17)
IMM GRANULOCYTES NFR BLD AUTO: 0.4 % — SIGNIFICANT CHANGE UP (ref 0–0.9)
INR BLD: 0.92 — SIGNIFICANT CHANGE UP (ref 0.85–1.18)
ISTAT ACTK (ACTIVATED CLOTTING TIME KAOLIN): 250 SEC — HIGH (ref 74–137)
ISTAT ACTK (ACTIVATED CLOTTING TIME KAOLIN): 287 SEC — HIGH (ref 74–137)
ISTAT INR: 1 — SIGNIFICANT CHANGE UP (ref 0.88–1.16)
ISTAT PT: 12.5 SEC — SIGNIFICANT CHANGE UP (ref 10–12.9)
LIPID PNL WITH DIRECT LDL SERPL: 81 MG/DL — SIGNIFICANT CHANGE UP
LYMPHOCYTES # BLD AUTO: 26.4 % — SIGNIFICANT CHANGE UP (ref 13–44)
LYMPHOCYTES # BLD AUTO: 3.31 K/UL — HIGH (ref 1–3.3)
MAGNESIUM SERPL-MCNC: 2 MG/DL — SIGNIFICANT CHANGE UP (ref 1.6–2.6)
MCHC RBC-ENTMCNC: 28.2 PG — SIGNIFICANT CHANGE UP (ref 27–34)
MCHC RBC-ENTMCNC: 33.3 GM/DL — SIGNIFICANT CHANGE UP (ref 32–36)
MCV RBC AUTO: 84.7 FL — SIGNIFICANT CHANGE UP (ref 80–100)
METHGB MFR BLDV: 0 % — SIGNIFICANT CHANGE UP
MONOCYTES # BLD AUTO: 1.07 K/UL — HIGH (ref 0–0.9)
MONOCYTES NFR BLD AUTO: 8.5 % — SIGNIFICANT CHANGE UP (ref 2–14)
NEUTROPHILS # BLD AUTO: 7.78 K/UL — HIGH (ref 1.8–7.4)
NEUTROPHILS NFR BLD AUTO: 62 % — SIGNIFICANT CHANGE UP (ref 43–77)
NON HDL CHOLESTEROL: 107 MG/DL — SIGNIFICANT CHANGE UP
NRBC # BLD: 0 /100 WBCS — SIGNIFICANT CHANGE UP (ref 0–0)
PCO2 BLDV: 51 MMHG — SIGNIFICANT CHANGE UP (ref 42–55)
PH BLDV: 7.35 — SIGNIFICANT CHANGE UP (ref 7.32–7.43)
PLATELET # BLD AUTO: 236 K/UL — SIGNIFICANT CHANGE UP (ref 150–400)
PO2 BLDV: 33 MMHG — SIGNIFICANT CHANGE UP (ref 25–45)
POCT ISTAT CREATININE: 0.9 MG/DL — SIGNIFICANT CHANGE UP (ref 0.5–1.3)
POTASSIUM BLDV-SCNC: 4.8 MMOL/L — SIGNIFICANT CHANGE UP (ref 3.5–5.1)
POTASSIUM SERPL-MCNC: 4.7 MMOL/L — SIGNIFICANT CHANGE UP (ref 3.5–5.3)
POTASSIUM SERPL-SCNC: 4.7 MMOL/L — SIGNIFICANT CHANGE UP (ref 3.5–5.3)
PROT SERPL-MCNC: 8.3 G/DL — SIGNIFICANT CHANGE UP (ref 6–8.3)
PROTHROM AB SERPL-ACNC: 10.5 SEC — SIGNIFICANT CHANGE UP (ref 9.5–13)
RBC # BLD: 5.18 M/UL — SIGNIFICANT CHANGE UP (ref 4.2–5.8)
RBC # FLD: 13.2 % — SIGNIFICANT CHANGE UP (ref 10.3–14.5)
SAO2 % BLDV: 59 % — LOW (ref 67–88)
SODIUM SERPL-SCNC: 137 MMOL/L — SIGNIFICANT CHANGE UP (ref 135–145)
TRIGL SERPL-MCNC: 131 MG/DL — SIGNIFICANT CHANGE UP
WBC # BLD: 12.54 K/UL — HIGH (ref 3.8–10.5)
WBC # FLD AUTO: 12.54 K/UL — HIGH (ref 3.8–10.5)

## 2024-01-16 PROCEDURE — 93010 ELECTROCARDIOGRAM REPORT: CPT

## 2024-01-16 PROCEDURE — 93454 CORONARY ARTERY ANGIO S&I: CPT | Mod: 26,59

## 2024-01-16 PROCEDURE — 99152 MOD SED SAME PHYS/QHP 5/>YRS: CPT

## 2024-01-16 PROCEDURE — 92978 ENDOLUMINL IVUS OCT C 1ST: CPT | Mod: 26,LC

## 2024-01-16 PROCEDURE — 92928 PRQ TCAT PLMT NTRAC ST 1 LES: CPT | Mod: LC

## 2024-01-16 RX ORDER — LISINOPRIL 2.5 MG/1
20 TABLET ORAL DAILY
Refills: 0 | Status: DISCONTINUED | OUTPATIENT
Start: 2024-01-16 | End: 2024-01-17

## 2024-01-16 RX ORDER — DEXTROSE 50 % IN WATER 50 %
12.5 SYRINGE (ML) INTRAVENOUS ONCE
Refills: 0 | Status: DISCONTINUED | OUTPATIENT
Start: 2024-01-16 | End: 2024-01-17

## 2024-01-16 RX ORDER — CLOPIDOGREL BISULFATE 75 MG/1
225 TABLET, FILM COATED ORAL ONCE
Refills: 0 | Status: COMPLETED | OUTPATIENT
Start: 2024-01-16 | End: 2024-01-16

## 2024-01-16 RX ORDER — INFLUENZA VIRUS VACCINE 15; 15; 15; 15 UG/.5ML; UG/.5ML; UG/.5ML; UG/.5ML
0.5 SUSPENSION INTRAMUSCULAR ONCE
Refills: 0 | Status: DISCONTINUED | OUTPATIENT
Start: 2024-01-16 | End: 2024-01-17

## 2024-01-16 RX ORDER — METOPROLOL TARTRATE 50 MG
1 TABLET ORAL
Refills: 0 | DISCHARGE

## 2024-01-16 RX ORDER — SODIUM CHLORIDE 9 MG/ML
250 INJECTION INTRAMUSCULAR; INTRAVENOUS; SUBCUTANEOUS ONCE
Refills: 0 | Status: COMPLETED | OUTPATIENT
Start: 2024-01-16 | End: 2024-01-16

## 2024-01-16 RX ORDER — CLOPIDOGREL BISULFATE 75 MG/1
75 TABLET, FILM COATED ORAL ONCE
Refills: 0 | Status: COMPLETED | OUTPATIENT
Start: 2024-01-16 | End: 2024-01-16

## 2024-01-16 RX ORDER — GLUCAGON INJECTION, SOLUTION 0.5 MG/.1ML
1 INJECTION, SOLUTION SUBCUTANEOUS ONCE
Refills: 0 | Status: DISCONTINUED | OUTPATIENT
Start: 2024-01-16 | End: 2024-01-17

## 2024-01-16 RX ORDER — SODIUM CHLORIDE 9 MG/ML
1000 INJECTION, SOLUTION INTRAVENOUS
Refills: 0 | Status: DISCONTINUED | OUTPATIENT
Start: 2024-01-16 | End: 2024-01-17

## 2024-01-16 RX ORDER — METOPROLOL TARTRATE 50 MG
50 TABLET ORAL
Refills: 0 | Status: DISCONTINUED | OUTPATIENT
Start: 2024-01-16 | End: 2024-01-17

## 2024-01-16 RX ORDER — ASPIRIN/CALCIUM CARB/MAGNESIUM 324 MG
81 TABLET ORAL ONCE
Refills: 0 | Status: COMPLETED | OUTPATIENT
Start: 2024-01-16 | End: 2024-01-16

## 2024-01-16 RX ORDER — DEXTROSE 50 % IN WATER 50 %
25 SYRINGE (ML) INTRAVENOUS ONCE
Refills: 0 | Status: DISCONTINUED | OUTPATIENT
Start: 2024-01-16 | End: 2024-01-17

## 2024-01-16 RX ORDER — SODIUM CHLORIDE 9 MG/ML
500 INJECTION INTRAMUSCULAR; INTRAVENOUS; SUBCUTANEOUS
Refills: 0 | Status: DISCONTINUED | OUTPATIENT
Start: 2024-01-16 | End: 2024-01-17

## 2024-01-16 RX ORDER — INSULIN LISPRO 100/ML
VIAL (ML) SUBCUTANEOUS
Refills: 0 | Status: DISCONTINUED | OUTPATIENT
Start: 2024-01-16 | End: 2024-01-17

## 2024-01-16 RX ORDER — ACETAMINOPHEN 500 MG
650 TABLET ORAL ONCE
Refills: 0 | Status: COMPLETED | OUTPATIENT
Start: 2024-01-16 | End: 2024-01-16

## 2024-01-16 RX ORDER — DEXTROSE 50 % IN WATER 50 %
15 SYRINGE (ML) INTRAVENOUS ONCE
Refills: 0 | Status: DISCONTINUED | OUTPATIENT
Start: 2024-01-16 | End: 2024-01-17

## 2024-01-16 RX ORDER — CLOPIDOGREL BISULFATE 75 MG/1
75 TABLET, FILM COATED ORAL DAILY
Refills: 0 | Status: DISCONTINUED | OUTPATIENT
Start: 2024-01-17 | End: 2024-01-17

## 2024-01-16 RX ORDER — ATORVASTATIN CALCIUM 80 MG/1
80 TABLET, FILM COATED ORAL AT BEDTIME
Refills: 0 | Status: DISCONTINUED | OUTPATIENT
Start: 2024-01-16 | End: 2024-01-17

## 2024-01-16 RX ORDER — ASPIRIN/CALCIUM CARB/MAGNESIUM 324 MG
81 TABLET ORAL DAILY
Refills: 0 | Status: DISCONTINUED | OUTPATIENT
Start: 2024-01-16 | End: 2024-01-17

## 2024-01-16 RX ADMIN — SODIUM CHLORIDE 500 MILLILITER(S): 9 INJECTION INTRAMUSCULAR; INTRAVENOUS; SUBCUTANEOUS at 07:41

## 2024-01-16 RX ADMIN — Medication 650 MILLIGRAM(S): at 14:30

## 2024-01-16 RX ADMIN — LISINOPRIL 20 MILLIGRAM(S): 2.5 TABLET ORAL at 13:44

## 2024-01-16 RX ADMIN — Medication 81 MILLIGRAM(S): at 07:40

## 2024-01-16 RX ADMIN — ATORVASTATIN CALCIUM 80 MILLIGRAM(S): 80 TABLET, FILM COATED ORAL at 22:12

## 2024-01-16 RX ADMIN — Medication 2: at 13:45

## 2024-01-16 RX ADMIN — SODIUM CHLORIDE 150 MILLILITER(S): 9 INJECTION INTRAMUSCULAR; INTRAVENOUS; SUBCUTANEOUS at 10:50

## 2024-01-16 RX ADMIN — Medication 1: at 22:14

## 2024-01-16 RX ADMIN — SODIUM CHLORIDE 75 MILLILITER(S): 9 INJECTION INTRAMUSCULAR; INTRAVENOUS; SUBCUTANEOUS at 07:41

## 2024-01-16 RX ADMIN — Medication 650 MILLIGRAM(S): at 13:44

## 2024-01-16 RX ADMIN — CLOPIDOGREL BISULFATE 75 MILLIGRAM(S): 75 TABLET, FILM COATED ORAL at 07:40

## 2024-01-16 RX ADMIN — Medication 50 MILLIGRAM(S): at 17:11

## 2024-01-16 RX ADMIN — CLOPIDOGREL BISULFATE 225 MILLIGRAM(S): 75 TABLET, FILM COATED ORAL at 08:06

## 2024-01-16 NOTE — DISCHARGE NOTE PROVIDER - CARE PROVIDER_API CALL
Khari Smith  Interventional Cardiology  [address]  [place] [address]  Phone: [phone]  Fax: [phone]  Established Patient  Follow Up Time: 2 weeks

## 2024-01-16 NOTE — PATIENT PROFILE ADULT - HAVE YOU BEEN EATING POORLY BECAUSE OF A DECREASED APPETITE?
Hello.     Your Hga1c has tipped over the 6.5% ethan.     I think you should  increase Metformin from 1000mg daily to 1500 &  Continue Glipizide 5mg.     Let me know if you agree & I can send this    Come see me in in 3 months with Hga1c lab test prior  
No (0)

## 2024-01-16 NOTE — PATIENT PROFILE ADULT - FALL HARM RISK - UNIVERSAL INTERVENTIONS
Bed in lowest position, wheels locked, appropriate side rails in place/Call bell, personal items and telephone in reach/Instruct patient to call for assistance before getting out of bed or chair/Non-slip footwear when patient is out of bed/[place] to call system/Physically safe environment - no spills, clutter or unnecessary equipment/Purposeful Proactive Rounding/Room/bathroom lighting operational, light cord in reach Bed in lowest position, wheels locked, appropriate side rails in place/Call bell, personal items and telephone in reach/Instruct patient to call for assistance before getting out of bed or chair/Non-slip footwear when patient is out of bed/[place] to call system/Physically safe environment - no spills, clutter or unnecessary equipment/Purposeful Proactive Rounding/Room/bathroom lighting operational, light cord in reach

## 2024-01-16 NOTE — DISCHARGE NOTE PROVIDER - NSDCMRMEDTOKEN_GEN_ALL_CORE_FT
Aspirin EC 81 mg oral delayed release tablet: 1 tab(s) orally once a day  atorvastatin 80 mg oral tablet: 1 tab(s) orally once a day (at bedtime)  clopidogrel 75 mg oral tablet: 1 tab(s) orally once a day  glipiZIDE 10 mg oral tablet: 1 tab(s) orally once a day  lisinopril 20 mg oral tablet: 1 tab(s) orally once a day  Lopressor 50 mg oral tablet: 1 tab(s) orally 2 times a day   Aspirin EC 81 mg oral delayed release tablet: 1 tab(s) orally once a day  atorvastatin 80 mg oral tablet: 1 tab(s) orally once a day (at bedtime)  Cardiac Rehab: You should attend cardiac rehab 3 times per week for 12 weeks.  clopidogrel 75 mg oral tablet: 1 tab(s) orally once a day  glipiZIDE 10 mg oral tablet: 1 tab(s) orally once a day  lisinopril 20 mg oral tablet: 1 tab(s) orally once a day  Lopressor 50 mg oral tablet: 1 tab(s) orally 2 times a day

## 2024-01-16 NOTE — DISCHARGE NOTE PROVIDER - NSDCCPCAREPLAN_GEN_ALL_CORE_FT
PRINCIPAL DISCHARGE DIAGNOSIS  Diagnosis: CAD (coronary artery disease)  Assessment and Plan of Treatment: -You underwent a cardiac catheterization 1/16/24 and the blockage in your obtuse marginal was opened with stent placement. NEVER MISS A DOSE OF ASPIRIN OR PLAVIX; IF YOU DO, YOU ARE AT RISK OF YOUR STENT CLOSING AND HAVING A HEART ATTACK. DO NOT STOP THESE TWO MEDICATIONS UNLESS INSTRUCTED TO DO SO BY YOUR CARDIOLOGIST. Your procedure was done through your left radial artery.You do not need to keep this area covered and you may shower. Please do not scrub the area, let the water wash over it when you shower. Please avoid any heavy lifting  (no more than 3 to 5 lbs) or strenuous activity for five days. If you develop any swelling, bleeding, hardening of the skin (hematoma formation), acute pain, numbness/tingling  in your arm or leg please contact your doctor immediately or call our 24/7 line: [phone] Please return to the hospital/seek immediate medical attention if worsening of symptoms- including not limited to chest pain, shortness of breath. Please follow up with Dr. Smith in 1-2 weeks. Please call their office and make an appointment to see them. Please continue a heart healthy diet low in sodium, cholesterol, and fat.  Please take aspirin and plavix daily. The possible side effects of these medications include increased bleeding risk and easy bruising. However, the benefits of preventing stent closure are greater than this risk, so you are advised to take these medications.       PRINCIPAL DISCHARGE DIAGNOSIS  Diagnosis: CAD (coronary artery disease)  Assessment and Plan of Treatment: -You underwent a cardiac catheterization 1/16/24 and the blockage in your obtuse marginal was opened with stent placement. NEVER MISS A DOSE OF ASPIRIN OR PLAVIX; IF YOU DO, YOU ARE AT RISK OF YOUR STENT CLOSING AND HAVING A HEART ATTACK. DO NOT STOP THESE TWO MEDICATIONS UNLESS INSTRUCTED TO DO SO BY YOUR CARDIOLOGIST.  Your procedure was done through your left radial artery.You do not need to keep this area covered and you may shower. Please do not scrub the area, let the water wash over it when you shower. Please avoid any heavy lifting  (no more than 3 to 5 lbs) or strenuous activity for five days. If you develop any swelling, bleeding, hardening of the skin (hematoma formation), acute pain, numbness/tingling  in your arm or leg please contact your doctor immediately or call our 24/7 line: [phone] Please return to the hospital/seek immediate medical attention if worsening of symptoms- including not limited to chest pain, shortness of breath.   Please follow up with Dr. Smith in 1-2 weeks. Please call their office and make an appointment to see them. Please continue a heart healthy diet low in sodium, cholesterol, and fat.  Please take aspirin and plavix daily. The possible side effects of these medications include increased bleeding risk and easy bruising. However, the benefits of preventing stent closure are greater than this risk, so you are advised to take these medications.        SECONDARY DISCHARGE DIAGNOSES  Diagnosis: HTN (hypertension)  Assessment and Plan of Treatment: You have a history of elevated blood pressure and you should continue your blood pressure medications as prescribed.      Diagnosis: HLD (hyperlipidemia)  Assessment and Plan of Treatment: Please continue your daily statin to ensure your cardiac stent remains open.      Diagnosis: Type 2 diabetes mellitus  Assessment and Plan of Treatment: You have a diagnosis of diabetes and should continue all of your diabetic medications as prescribed. Please do not take your metformin for 2 days to prevent interaction with the contrast you recieved.

## 2024-01-16 NOTE — DISCHARGE NOTE PROVIDER - HOSPITAL COURSE
58-year-old male, current smoker (15 pack years), PMHx HTN, HLD, T2DM who presented to [facility] 1/10/24 with chest pain + CCTA r/i NSTEMI (Trop I peak 0.330) . Pt had diagnostic C 1/10/24 (see below) with planned PCI of culprit lesion LLCx at [facility] as an outpatient. Pt currently denies chest pain, shortness of breath, abdominal pain, N/V/D, fatigue, syncope, presyncope, leg pain/swelling, fever and recent sick contacts.    LHC (1/10/24): m/d LCx 95% stenosed, LPAV 80% stenosed. RCA small mod diffuse, p/mRCA 40% stenosed. LM large minimal LI. LAD large, moderate diffuse, D1 50% stenosed. LVEF 65%, trivial MR. Right radial access.  CCTA (1/10/24): 3vCAD significant obstructive dz diagonal branch of LAD and OM1 of LCX  Echo (1/9/24): EF 62, normal wall motion, trace TR. no AS.    In light of patient's risk factors and known CAD on recent cath, pt now presents to [facility] for staged cardiac catheterization of LCx.    s/p cath with Luis Sherwood 1/16/2024 for staged PCI of Complex OM lesion, IVUS guided SEVERIANO x2 p-d OM2 thrombotic culprit lesion, LCX mild luminal, OM1 30%, LM short luminal irreg,LAD mild diffuse luminal irreg, no EDP, Right Radial artery occluded --> left radial access    Pt is asymptomatic at this time and denies chest pain, SOB, DIAZ, palpitations, dizziness, LOC, N/V, diaphoresis, orthopnea/PND, and leg swelling. Pt able to ambulate and void without complication. VSS. Labs and telemetry reviewed. Left radial access site stable and dressing C/D/I.  Pt is a candidate for discharge per Dr. Baer. Pt given appropriate discharge instructions, pt states they have an appropriate amount of their previous home meds unchanged from this visit at home, and any new medications were sent to their pharmacy. Pt instructed to f/u with Dr Smith in 1-2 weeks.           Cardiac Rehab (STEMI/NSTEMI/ACS/Unstable Angina/CHF/Post PCI):            *Education on benefits of Cardiac Rehab provided to patient: Yes         *Referral and Prescription Given for Cardiac Rehab : Yes         *Pt given list of locations & instructed to contact their insurance company to review list of participating providers    Statin Prescribed (STEMI/NSTEMI/UA &/OR PCI this admission):  Yes  DAPT: Prescriptions for Aspirin/Plavix/Brilinta/Effient e-prescribed to patient's pharmacy Yes    GLP-1 receptor agonist/SGLT2 inhibitor meds discussed w/ patient and encouraged to discuss further with PMD or Endocrinologist at next visit.      Pt discharge copies detail cardiovascular history, medications, testing/treatments, OR patient has created a patient portal account and instructed to provide their records at their 1st appointment.

## 2024-01-17 VITALS
OXYGEN SATURATION: 97 % | HEART RATE: 68 BPM | DIASTOLIC BLOOD PRESSURE: 75 MMHG | SYSTOLIC BLOOD PRESSURE: 153 MMHG | RESPIRATION RATE: 18 BRPM

## 2024-01-17 LAB
ANION GAP SERPL CALC-SCNC: 9 MMOL/L — SIGNIFICANT CHANGE UP (ref 5–17)
BUN SERPL-MCNC: 15 MG/DL — SIGNIFICANT CHANGE UP (ref 7–23)
CALCIUM SERPL-MCNC: 9.3 MG/DL — SIGNIFICANT CHANGE UP (ref 8.4–10.5)
CHLORIDE SERPL-SCNC: 105 MMOL/L — SIGNIFICANT CHANGE UP (ref 96–108)
CO2 SERPL-SCNC: 25 MMOL/L — SIGNIFICANT CHANGE UP (ref 22–31)
CREAT SERPL-MCNC: 0.9 MG/DL — SIGNIFICANT CHANGE UP (ref 0.5–1.3)
EGFR: 100 ML/MIN/1.73M2 — SIGNIFICANT CHANGE UP
GLUCOSE BLDC GLUCOMTR-MCNC: 134 MG/DL — HIGH (ref 70–99)
GLUCOSE BLDC GLUCOMTR-MCNC: 145 MG/DL — HIGH (ref 70–99)
GLUCOSE SERPL-MCNC: 166 MG/DL — HIGH (ref 70–99)
HCT VFR BLD CALC: 39.5 % — SIGNIFICANT CHANGE UP (ref 39–50)
HGB BLD-MCNC: 13.2 G/DL — SIGNIFICANT CHANGE UP (ref 13–17)
MAGNESIUM SERPL-MCNC: 2.1 MG/DL — SIGNIFICANT CHANGE UP (ref 1.6–2.6)
MCHC RBC-ENTMCNC: 28.6 PG — SIGNIFICANT CHANGE UP (ref 27–34)
MCHC RBC-ENTMCNC: 33.4 GM/DL — SIGNIFICANT CHANGE UP (ref 32–36)
MCV RBC AUTO: 85.5 FL — SIGNIFICANT CHANGE UP (ref 80–100)
NRBC # BLD: 0 /100 WBCS — SIGNIFICANT CHANGE UP (ref 0–0)
PLATELET # BLD AUTO: 189 K/UL — SIGNIFICANT CHANGE UP (ref 150–400)
POTASSIUM SERPL-MCNC: 5 MMOL/L — SIGNIFICANT CHANGE UP (ref 3.5–5.3)
POTASSIUM SERPL-SCNC: 5 MMOL/L — SIGNIFICANT CHANGE UP (ref 3.5–5.3)
RBC # BLD: 4.62 M/UL — SIGNIFICANT CHANGE UP (ref 4.2–5.8)
RBC # FLD: 13.3 % — SIGNIFICANT CHANGE UP (ref 10.3–14.5)
SODIUM SERPL-SCNC: 139 MMOL/L — SIGNIFICANT CHANGE UP (ref 135–145)
WBC # BLD: 10.32 K/UL — SIGNIFICANT CHANGE UP (ref 3.8–10.5)
WBC # FLD AUTO: 10.32 K/UL — SIGNIFICANT CHANGE UP (ref 3.8–10.5)

## 2024-01-17 PROCEDURE — C1769: CPT

## 2024-01-17 PROCEDURE — C1887: CPT

## 2024-01-17 PROCEDURE — 36415 COLL VENOUS BLD VENIPUNCTURE: CPT

## 2024-01-17 PROCEDURE — 80053 COMPREHEN METABOLIC PANEL: CPT

## 2024-01-17 PROCEDURE — C1874: CPT

## 2024-01-17 PROCEDURE — 83735 ASSAY OF MAGNESIUM: CPT

## 2024-01-17 PROCEDURE — 85730 THROMBOPLASTIN TIME PARTIAL: CPT

## 2024-01-17 PROCEDURE — 80061 LIPID PANEL: CPT

## 2024-01-17 PROCEDURE — 85610 PROTHROMBIN TIME: CPT

## 2024-01-17 PROCEDURE — 80048 BASIC METABOLIC PNL TOTAL CA: CPT

## 2024-01-17 PROCEDURE — 82565 ASSAY OF CREATININE: CPT

## 2024-01-17 PROCEDURE — 99239 HOSP IP/OBS DSCHRG MGMT >30: CPT

## 2024-01-17 PROCEDURE — C1725: CPT

## 2024-01-17 PROCEDURE — 93005 ELECTROCARDIOGRAM TRACING: CPT

## 2024-01-17 PROCEDURE — C1753: CPT

## 2024-01-17 PROCEDURE — 82962 GLUCOSE BLOOD TEST: CPT

## 2024-01-17 PROCEDURE — 85025 COMPLETE CBC W/AUTO DIFF WBC: CPT

## 2024-01-17 PROCEDURE — C1894: CPT

## 2024-01-17 PROCEDURE — 85027 COMPLETE CBC AUTOMATED: CPT

## 2024-01-17 PROCEDURE — 83036 HEMOGLOBIN GLYCOSYLATED A1C: CPT

## 2024-01-17 PROCEDURE — 85347 COAGULATION TIME ACTIVATED: CPT

## 2024-01-17 PROCEDURE — 82803 BLOOD GASES ANY COMBINATION: CPT

## 2024-01-17 RX ORDER — LISINOPRIL 2.5 MG/1
1 TABLET ORAL
Refills: 0 | DISCHARGE

## 2024-01-17 RX ORDER — METOPROLOL TARTRATE 50 MG
1 TABLET ORAL
Qty: 60 | Refills: 6
Start: 2024-01-17 | End: 2024-08-13

## 2024-01-17 RX ORDER — ASPIRIN/CALCIUM CARB/MAGNESIUM 324 MG
1 TABLET ORAL
Refills: 0 | DISCHARGE

## 2024-01-17 RX ORDER — ATORVASTATIN CALCIUM 80 MG/1
1 TABLET, FILM COATED ORAL
Refills: 0 | DISCHARGE

## 2024-01-17 RX ORDER — ASPIRIN/CALCIUM CARB/MAGNESIUM 324 MG
1 TABLET ORAL
Qty: 30 | Refills: 11
Start: 2024-01-17 | End: 2025-01-10

## 2024-01-17 RX ORDER — CLOPIDOGREL BISULFATE 75 MG/1
1 TABLET, FILM COATED ORAL
Refills: 0 | DISCHARGE

## 2024-01-17 RX ORDER — LISINOPRIL 2.5 MG/1
1 TABLET ORAL
Qty: 30 | Refills: 6
Start: 2024-01-17 | End: 2024-08-13

## 2024-01-17 RX ORDER — ATORVASTATIN CALCIUM 80 MG/1
1 TABLET, FILM COATED ORAL
Qty: 30 | Refills: 6
Start: 2024-01-17 | End: 2024-08-13

## 2024-01-17 RX ORDER — METOPROLOL TARTRATE 50 MG
1 TABLET ORAL
Refills: 0 | DISCHARGE

## 2024-01-17 RX ORDER — CLOPIDOGREL BISULFATE 75 MG/1
1 TABLET, FILM COATED ORAL
Qty: 30 | Refills: 11
Start: 2024-01-17 | End: 2025-01-10

## 2024-01-17 RX ADMIN — LISINOPRIL 20 MILLIGRAM(S): 2.5 TABLET ORAL at 05:22

## 2024-01-17 RX ADMIN — CLOPIDOGREL BISULFATE 75 MILLIGRAM(S): 75 TABLET, FILM COATED ORAL at 13:00

## 2024-01-17 RX ADMIN — Medication 81 MILLIGRAM(S): at 13:00

## 2024-01-17 RX ADMIN — Medication 50 MILLIGRAM(S): at 05:22

## 2024-01-17 NOTE — DISCHARGE NOTE NURSING/CASE MANAGEMENT/SOCIAL WORK - NSDCPEFALRISK_GEN_ALL_CORE
For information on Fall & Injury Prevention, visit: [email or web address] OR  [email or web address] OR  https://www.cdc.gov/steadi/patient.html

## 2024-01-17 NOTE — DISCHARGE NOTE NURSING/CASE MANAGEMENT/SOCIAL WORK - PATIENT PORTAL LINK FT
You can access the FollowMyHealth Patient Portal offered by [facility] by registering at the following website: [email or web address]. By joining [employer]’s FollowMyHealth portal, you will also be able to view your health information using other applications (apps) compatible with our system.

## 2024-01-23 DIAGNOSIS — E78.5 HYPERLIPIDEMIA, UNSPECIFIED: ICD-10-CM

## 2024-01-23 DIAGNOSIS — I25.84 CORONARY ATHEROSCLEROSIS DUE TO CALCIFIED CORONARY LESION: ICD-10-CM

## 2024-01-23 DIAGNOSIS — I21.9 ACUTE MYOCARDIAL INFARCTION, UNSPECIFIED: ICD-10-CM

## 2024-01-23 DIAGNOSIS — F17.210 NICOTINE DEPENDENCE, CIGARETTES, UNCOMPLICATED: ICD-10-CM

## 2024-01-23 DIAGNOSIS — Z79.82 LONG TERM (CURRENT) USE OF ASPIRIN: ICD-10-CM

## 2024-01-23 DIAGNOSIS — I10 ESSENTIAL (PRIMARY) HYPERTENSION: ICD-10-CM

## 2024-01-23 DIAGNOSIS — I25.2 OLD MYOCARDIAL INFARCTION: ICD-10-CM

## 2024-01-23 DIAGNOSIS — I22.2 SUBSEQUENT NON-ST ELEVATION (NSTEMI) MYOCARDIAL INFARCTION: ICD-10-CM

## 2024-01-23 DIAGNOSIS — E11.9 TYPE 2 DIABETES MELLITUS WITHOUT COMPLICATIONS: ICD-10-CM

## 2024-01-23 DIAGNOSIS — I25.10 ATHEROSCLEROTIC HEART DISEASE OF NATIVE CORONARY ARTERY WITHOUT ANGINA PECTORIS: ICD-10-CM

## 2024-01-23 DIAGNOSIS — Z79.02 LONG TERM (CURRENT) USE OF ANTITHROMBOTICS/ANTIPLATELETS: ICD-10-CM
